# Patient Record
Sex: MALE | Race: WHITE | Employment: UNEMPLOYED | URBAN - NONMETROPOLITAN AREA
[De-identification: names, ages, dates, MRNs, and addresses within clinical notes are randomized per-mention and may not be internally consistent; named-entity substitution may affect disease eponyms.]

---

## 2020-11-06 ENCOUNTER — HOSPITAL ENCOUNTER (EMERGENCY)
Age: 45
Discharge: HOME | End: 2020-11-06
Attending: EMERGENCY MEDICINE
Payer: COMMERCIAL

## 2020-11-06 VITALS
RESPIRATION RATE: 16 BRPM | TEMPERATURE: 97.9 F | DIASTOLIC BLOOD PRESSURE: 83 MMHG | OXYGEN SATURATION: 97 % | HEIGHT: 67 IN | WEIGHT: 192 LBS | HEART RATE: 92 BPM | BODY MASS INDEX: 30.13 KG/M2 | SYSTOLIC BLOOD PRESSURE: 138 MMHG

## 2020-11-06 PROCEDURE — 6370000000 HC RX 637 (ALT 250 FOR IP): Performed by: EMERGENCY MEDICINE

## 2020-11-06 PROCEDURE — 99282 EMERGENCY DEPT VISIT SF MDM: CPT

## 2020-11-06 PROCEDURE — 12011 RPR F/E/E/N/L/M 2.5 CM/<: CPT

## 2020-11-06 RX ORDER — LIDOCAINE HYDROCHLORIDE 40 MG/ML
2.5 INJECTION, SOLUTION RETROBULBAR; TOPICAL ONCE
Status: DISCONTINUED | OUTPATIENT
Start: 2020-11-06 | End: 2020-11-06 | Stop reason: HOSPADM

## 2020-11-06 RX ORDER — LIDOCAINE HYDROCHLORIDE 40 MG/ML
SOLUTION TOPICAL ONCE
Status: DISCONTINUED | OUTPATIENT
Start: 2020-11-06 | End: 2020-11-06

## 2020-11-06 RX ORDER — LIDOCAINE HYDROCHLORIDE 40 MG/ML
INJECTION, SOLUTION RETROBULBAR; TOPICAL
Status: DISCONTINUED
Start: 2020-11-06 | End: 2020-11-06 | Stop reason: HOSPADM

## 2020-11-06 RX ORDER — CHLORHEXIDINE GLUCONATE 0.12 MG/ML
15 RINSE ORAL ONCE
Status: COMPLETED | OUTPATIENT
Start: 2020-11-06 | End: 2020-11-06

## 2020-11-06 RX ORDER — CHLORHEXIDINE GLUCONATE 0.12 MG/ML
15 RINSE ORAL 2 TIMES DAILY
Qty: 420 ML | Refills: 0 | Status: SHIPPED | OUTPATIENT
Start: 2020-11-06 | End: 2020-11-20

## 2020-11-06 RX ADMIN — CHLORHEXIDINE GLUCONATE 0.12% ORAL RINSE 15 ML: 1.2 LIQUID ORAL at 19:30

## 2020-11-06 ASSESSMENT — PAIN DESCRIPTION - LOCATION: LOCATION: MOUTH

## 2020-11-06 ASSESSMENT — PAIN DESCRIPTION - DESCRIPTORS: DESCRIPTORS: NAGGING

## 2020-11-06 ASSESSMENT — PAIN SCALES - GENERAL: PAINLEVEL_OUTOF10: 4

## 2020-11-06 ASSESSMENT — PAIN DESCRIPTION - PAIN TYPE: TYPE: ACUTE PAIN

## 2020-11-07 NOTE — ED NOTES
Patient discharged with [facility] rep. States they will be able to provide tylenol/ibuprofen for pain relief and will be able to fill prescription tomorrow am. Patient denies questions at this time.       Be Dawson RN  11/06/20 1810

## 2020-11-07 NOTE — ED PROVIDER NOTES
[facility]  ED encounter       CHIEF COMPLAINT   Chief Complaint   Patient presents with    Laceration     Tongue, onset 1 hr ago while playing basketball. Pt was hit in mouth with an elbow     HPI   Judy Wiley is a 54 y.o. male who lacerated the left tip of his tounge. The mechanism of the injury was basketball. This occurred this evening. Associated bleeding was alleviated by pressure. The pain is moderate    REVIEW OF SYSTEMS   Neurologic: No numbness or weakness distal to the wound  Skin: Bleeding Laceration as mentioned above  Musculoskeletal: No bony deformity      PAST MEDICAL & SURGICAL HISTORY   History reviewed. No pertinent past medical history.   Past Surgical History:   Procedure Laterality Date    CHOLECYSTECTOMY       CURRENT MEDICATIONS   Current Outpatient Rx   Medication Sig Dispense Refill    chlorhexidine (PERIDEX) 0.12 % solution Take 15 mLs by mouth 2 times daily for 14 days 420 mL 0     ALLERGIES   No Known Allergies  SOCIAL & FAMILY HISTORY   Social History     Socioeconomic History    Marital status:      Spouse name: None    Number of children: None    Years of education: None    Highest education level: None   Occupational History    None   Social Needs    Financial resource strain: None    Food insecurity     Worry: None     Inability: None    Transportation needs     Medical: None     Non-medical: None   Tobacco Use    Smoking status: Current Every Day Smoker     Packs/day: 1.00     Types: Cigarettes    Smokeless tobacco: Never Used   Substance and Sexual Activity    Alcohol use: None    Drug use: None    Sexual activity: None   Lifestyle    Physical activity     Days per week: None     Minutes per session: None    Stress: None   Relationships    Social connections     Talks on phone: None     Gets together: None     Attends Congregation service: None     Active member of club or organization: None     Attends meetings of clubs or organizations: None Relationship status: None    Intimate partner violence     Fear of current or ex partner: None     Emotionally abused: None     Physically abused: None     Forced sexual activity: None   Other Topics Concern    None   Social History Narrative    None     History reviewed. No pertinent family history. PHYSICAL EXAM   VITAL SIGNS: /83   Pulse 92   Temp 97.9 °F (36.6 °C) (Temporal)   Resp 16   Ht 5' 7\" (1.702 m)   Wt 192 lb (87.1 kg)   SpO2 97%   BMI 30.07 kg/m²   Constitutional: Well developed, well-nourished  HENT: 1.5 cm tip of left tongue laceration  NECK: Supple, No neck swelling  Respiratory: No respiratory distress  Cardiovascular: No JVD   NEUROLOGIC: Motor and sensory distal to the wound is intact and normal, patient is awake, alert, no slurred speech  Musculoskeletal: no injuries  Integument: 1.5 cm laceration localized over the toungue , not through and through    Laceration Repair Procedure Note  Details of the Procedure: The patient was placed in the appropriate position and local anesthesia obtained by infiltration using 4% topical lido The laceration was explored, then closed with closed with 5-0 with chromic gut. Total repaired wound length: 1.5 cm. Complications: None    RADIOLOGY  No orders to display     ED COURSE & MEDICAL DECISION MAKING   See chart for details of any medications ordered  Vitals:    11/06/20 1659   BP: 138/83   Pulse: 92   Resp: 16   Temp: 97.9 °F (36.6 °C)   TempSrc: Temporal   SpO2: 97%   Weight: 192 lb (87.1 kg)   Height: 5' 7\" (1.702 m)       Differential diagnosis: Tendon laceration, neurologic injury, vascular injury, involvement of bone that could lead to osteomyelitis, foreign body, left in the wound, delayed bacterial skin infection, other    Mdm: Patient well-appearing. I repaired his laceration. He will go rebekah with chlorhexidine swish and spit for 2 weeks. FINAL IMPRESSION   1.  Laceration of tongue, initial encounter        PLAN: Outpatient treatment, follow-up, and discharge instructions (see EMR)  Westley Betancourt MD    This note was completed with a voice recognition program.            Westley Betancourt MD  11/06/20 7013

## 2023-11-08 PROBLEM — S61.217A: Status: ACTIVE | Noted: 2019-03-20

## 2023-11-09 ENCOUNTER — LAB (OUTPATIENT)
Dept: LAB | Facility: LAB | Age: 48
End: 2023-11-09
Payer: COMMERCIAL

## 2023-11-09 ENCOUNTER — OFFICE VISIT (OUTPATIENT)
Dept: PRIMARY CARE | Facility: CLINIC | Age: 48
End: 2023-11-09
Payer: COMMERCIAL

## 2023-11-09 ENCOUNTER — TELEPHONE (OUTPATIENT)
Dept: PRIMARY CARE | Facility: CLINIC | Age: 48
End: 2023-11-09

## 2023-11-09 VITALS
WEIGHT: 181.4 LBS | BODY MASS INDEX: 28.47 KG/M2 | OXYGEN SATURATION: 98 % | DIASTOLIC BLOOD PRESSURE: 68 MMHG | HEIGHT: 67 IN | HEART RATE: 72 BPM | SYSTOLIC BLOOD PRESSURE: 118 MMHG

## 2023-11-09 DIAGNOSIS — Z13.220 SCREENING, LIPID: ICD-10-CM

## 2023-11-09 DIAGNOSIS — Z13.1 SCREENING FOR DIABETES MELLITUS: ICD-10-CM

## 2023-11-09 DIAGNOSIS — K62.5 RECTAL BLEEDING: ICD-10-CM

## 2023-11-09 DIAGNOSIS — K62.5 RECTAL BLEEDING: Primary | ICD-10-CM

## 2023-11-09 DIAGNOSIS — Z11.3 SCREEN FOR STD (SEXUALLY TRANSMITTED DISEASE): ICD-10-CM

## 2023-11-09 DIAGNOSIS — Z12.11 ENCOUNTER FOR SCREENING FOR MALIGNANT NEOPLASM OF COLON: ICD-10-CM

## 2023-11-09 DIAGNOSIS — K64.4 EXTERNAL HEMORRHOIDS: ICD-10-CM

## 2023-11-09 DIAGNOSIS — F17.200 TOBACCO USE DISORDER: ICD-10-CM

## 2023-11-09 DIAGNOSIS — B00.9 HSV-1 INFECTION: ICD-10-CM

## 2023-11-09 PROBLEM — K64.9 BLEEDING HEMORRHOIDS: Status: ACTIVE | Noted: 2023-11-09

## 2023-11-09 PROBLEM — K64.9 BLEEDING HEMORRHOIDS: Status: RESOLVED | Noted: 2023-11-09 | Resolved: 2023-11-09

## 2023-11-09 LAB
ALBUMIN SERPL BCP-MCNC: 4.4 G/DL (ref 3.4–5)
ALP SERPL-CCNC: 90 U/L (ref 33–120)
ALT SERPL W P-5'-P-CCNC: 12 U/L (ref 10–52)
ANION GAP SERPL CALC-SCNC: 11 MMOL/L (ref 10–20)
AST SERPL W P-5'-P-CCNC: 11 U/L (ref 9–39)
BASOPHILS # BLD AUTO: 0.07 X10*3/UL (ref 0–0.1)
BASOPHILS NFR BLD AUTO: 1 %
BILIRUB SERPL-MCNC: 0.5 MG/DL (ref 0–1.2)
BUN SERPL-MCNC: 12 MG/DL (ref 6–23)
CALCIUM SERPL-MCNC: 9.6 MG/DL (ref 8.6–10.3)
CHLORIDE SERPL-SCNC: 103 MMOL/L (ref 98–107)
CHOLEST SERPL-MCNC: 189 MG/DL (ref 0–199)
CHOLESTEROL/HDL RATIO: 3.4
CO2 SERPL-SCNC: 30 MMOL/L (ref 21–32)
CREAT SERPL-MCNC: 0.83 MG/DL (ref 0.5–1.3)
EOSINOPHIL # BLD AUTO: 0.19 X10*3/UL (ref 0–0.7)
EOSINOPHIL NFR BLD AUTO: 2.8 %
ERYTHROCYTE [DISTWIDTH] IN BLOOD BY AUTOMATED COUNT: 13.7 % (ref 11.5–14.5)
EST. AVERAGE GLUCOSE BLD GHB EST-MCNC: 105 MG/DL
GFR SERPL CREATININE-BSD FRML MDRD: >90 ML/MIN/1.73M*2
GLUCOSE SERPL-MCNC: 99 MG/DL (ref 74–99)
HBA1C MFR BLD: 5.3 %
HCT VFR BLD AUTO: 48.4 % (ref 41–52)
HCV AB SER QL: NONREACTIVE
HDLC SERPL-MCNC: 55 MG/DL
HGB BLD-MCNC: 15.9 G/DL (ref 13.5–17.5)
HIV 1+2 AB+HIV1 P24 AG SERPL QL IA: NONREACTIVE
IMM GRANULOCYTES # BLD AUTO: 0.01 X10*3/UL (ref 0–0.7)
IMM GRANULOCYTES NFR BLD AUTO: 0.1 % (ref 0–0.9)
LDLC SERPL CALC-MCNC: 116 MG/DL
LYMPHOCYTES # BLD AUTO: 2.09 X10*3/UL (ref 1.2–4.8)
LYMPHOCYTES NFR BLD AUTO: 30.7 %
MCH RBC QN AUTO: 30.4 PG (ref 26–34)
MCHC RBC AUTO-ENTMCNC: 32.9 G/DL (ref 32–36)
MCV RBC AUTO: 93 FL (ref 80–100)
MONOCYTES # BLD AUTO: 0.47 X10*3/UL (ref 0.1–1)
MONOCYTES NFR BLD AUTO: 6.9 %
NEUTROPHILS # BLD AUTO: 3.98 X10*3/UL (ref 1.2–7.7)
NEUTROPHILS NFR BLD AUTO: 58.5 %
NON HDL CHOLESTEROL: 134 MG/DL (ref 0–149)
NRBC BLD-RTO: 0 /100 WBCS (ref 0–0)
PLATELET # BLD AUTO: 228 X10*3/UL (ref 150–450)
POTASSIUM SERPL-SCNC: 4.8 MMOL/L (ref 3.5–5.3)
PROT SERPL-MCNC: 7.1 G/DL (ref 6.4–8.2)
RBC # BLD AUTO: 5.23 X10*6/UL (ref 4.5–5.9)
SODIUM SERPL-SCNC: 139 MMOL/L (ref 136–145)
T PALLIDUM AB SER QL: NONREACTIVE
TRIGL SERPL-MCNC: 92 MG/DL (ref 0–149)
VLDL: 18 MG/DL (ref 0–40)
WBC # BLD AUTO: 6.8 X10*3/UL (ref 4.4–11.3)

## 2023-11-09 PROCEDURE — 36415 COLL VENOUS BLD VENIPUNCTURE: CPT

## 2023-11-09 PROCEDURE — 87389 HIV-1 AG W/HIV-1&-2 AB AG IA: CPT

## 2023-11-09 PROCEDURE — 80053 COMPREHEN METABOLIC PANEL: CPT

## 2023-11-09 PROCEDURE — 80061 LIPID PANEL: CPT

## 2023-11-09 PROCEDURE — 87800 DETECT AGNT MULT DNA DIREC: CPT

## 2023-11-09 PROCEDURE — 83036 HEMOGLOBIN GLYCOSYLATED A1C: CPT

## 2023-11-09 PROCEDURE — 86780 TREPONEMA PALLIDUM: CPT

## 2023-11-09 PROCEDURE — 99204 OFFICE O/P NEW MOD 45 MIN: CPT | Performed by: STUDENT IN AN ORGANIZED HEALTH CARE EDUCATION/TRAINING PROGRAM

## 2023-11-09 PROCEDURE — 85025 COMPLETE CBC W/AUTO DIFF WBC: CPT

## 2023-11-09 PROCEDURE — 86803 HEPATITIS C AB TEST: CPT

## 2023-11-09 NOTE — ASSESSMENT & PLAN NOTE
Due to significant rectal bleeding will obtain diagnostic colonoscopy as well as repeat blood counts.  I feel the most likely cause of this is internal bleeding hemorrhoids but I am unable to rule out more dangerous causes such as colon cancer.  Provided information about hemorrhoids as well as supportive care and therapies.

## 2023-11-09 NOTE — PATIENT INSTRUCTIONS
INTRODUCTION  It can be scary to see blood in the toilet or when you wipe after a bowel movement. Fortunately, most of the causes of rectal bleeding are not life-threatening; common causes include hemorrhoids and anal fissures, which are discussed below. However, the only way to be certain of the cause is to be evaluated by a health care provider.  This article will discuss when to seek help for rectal bleeding, the most common causes of blood in the stool, and tests that may be recommended.  WHEN TO SEEK HELP  Most people with minor rectal bleeding do not have colon cancer or another serious condition. However, it is not possible to know the cause of bleeding without an examination by a clinician. For this reason, if you ever notice blood in your stool or bleeding from your rectum, you should contact your health care provider as soon as possible. They can give you advice about whether and when you should be examined or schedule tests.  You should also seek medical care if you notice a change in the frequency or consistency of your bowel movements, have abdominal pain, or feel very tired or weak.  RECTAL BLEEDING CAUSES   Common benign (non-serious) causes -- If you see a small amount of bright red blood on the toilet paper after wiping, on the outside of your stool, or in the toilet, this may be caused by hemorrhoids or an anal fissure. Both of these conditions are benign, and there are treatments that can help.  Hemorrhoids -- Hemorrhoids are swollen blood vessels in the rectum or anus that can be painful, itchy, and sometimes bleed (figure 1). People with hemorrhoids often have painless rectal bleeding; bright red blood may coat the stool after a bowel movement, drip into the toilet, or stain toilet paper. More information about hemorrhoids is available separately.   Anal fissure -- An anal fissure is a tear in the lining of the anus (the opening where stool exits the body). Anal fissures can cause bleeding and a  sensation of tearing, ripping, or burning pain during or after a bowel movement. This is discussed more separately.   Serious causes -- While most rectal bleeding is caused by the non-serious causes mentioned above, it can also be caused by cancerous or precancerous conditions. Precancerous polyps near the end of the colon (large intestine) can mimic bleeding from hemorrhoids. These polyps are generally present in the colon for years before they become cancerous, and they can be removed very safely from the colon, preventing progression to cancer.   Colorectal cancer is sometimes diagnosed in people who have ignored bleeding for years because they assumed it was from hemorrhoids. This is why anyone with rectal bleeding should consult a health care provider to determine the cause. If colorectal cancer is diagnosed early, it can often be treated.   The risk of polyps and colon cancer increases with age; this is why screening tests are usually recommended beginning at age 45 to 50 years, or earlier for people with certain additional risk factors.   Other possible causes -- There are other causes of rectal bleeding as well, including colitis (inflammation of the colon), proctitis (inflammation of the rectum), diverticular disease, angioectasias (abnormal blood vessels in the gastrointestinal tract), and rectal ulcers.  Bleeding from higher in the digestive tract, such as the stomach, can cause black, tarry-looking bowel movements; this is because stomach acid turns blood black. In other cases, however, upper gastrointestinal bleeding appears bright red in color.  Your health care provider can figure out the cause of your bleeding based on your symptoms and test results.  RECTAL BLEEDING TESTS  The best approach to determining the cause of rectal bleeding depends upon your age, symptoms, and medical history. Your health care provider can learn more by asking questions about your bleeding (including frequency, amount, and  "appearance), bowel habits, whether you have other symptoms such as pain, and your family history (for example, if you have relatives who have been diagnosed with colorectal cancer or other cancers).  Physical examination -- Sometimes a health care provider can determine the cause of rectal bleeding with a rectal examination. This typically involves inspecting the outside of the anus and inserting a gloved finger into the rectum to feel for lumps or other abnormalities. An anoscopy may be done at the same time. During an anoscopy, the clinician uses a rigid tube to inspect the anal canal and lower rectum. This can be done in the office and does not require sedation.  Diagnostic tests -- There are several tests that allow a provider to examine the inside of the colon, rectum, and anus. These procedures are performed using specialized instruments called \"scopes\":  ?Sigmoidoscopy - During a sigmoidoscopy, a clinician can examine the rectum and most of the lower large intestine  ?Colonoscopy - A colonoscopy is a procedure in which a clinician examines the entire colon.   While the recommended tests may be different for different people, the important thing is to see a health care provider for evaluation. This is the only way to figure out for sure what is causing your rectal bleeding and get you the care and treatment you need.  "

## 2023-11-09 NOTE — PROGRESS NOTES
Subjective:  Tashi Sarkar is a 48 y.o. male who presents to clinic today for No chief complaint on file.      ***    Review of Systems    Assessment/Plan:  Tashi Sarkar is a 48 y.o. male with a history of *** who presents to clinic today to address the following issues:   1. Bleeding hemorrhoids            Problem List Items Addressed This Visit          Gastrointestinal and Abdominal    Bleeding hemorrhoids - Primary    Overview     ....ddd           Current Assessment & Plan     dddd                There are no Patient Instructions on file for this visit.    Follow up: ***    Return precautions discussed.  An After Visit Summary was given to the patient.  All questions were answered and patient in agreement with plan.    Objective:  There were no vitals taken for this visit.  ***  Physical Exam    I spent *** minutes in total time for this visit including all related clinical activities before, during, and after the visit excluding other billable activities/procedure time.     Kalina Espinoza MD

## 2023-11-09 NOTE — PROGRESS NOTES
Subjective:  Tashi Sarkar is a 48 y.o. male who presents to clinic today for NP, ER FU Rectal Bleeding (10/17)    Rectal Bleeding:  - typically bright red but has had some maroon stools  - started a few months ago, initially was spotting  - always after having a bowel movement  - toilet water is turning red, not just on toilet paper  - progressively worsening symptoms  - no pain with bowel movements  - no difficulty with bowel movements  - stools are usual caliber   - no unintentional weight loss or fevers  - no obvious pain with sitting or hemorrhoids   - does not strain with bowel movements   - no stomach issues or pain  - no heart burn  - stools are sometimes hard and streaking the toilet seat    Assessment/Plan:  Tashi Sarkar is a 48 y.o. male with a history of HSV-1, alcohol use and tobacco use disorder who presents to clinic today to address the following issues:   1. Rectal bleeding  CBC and Auto Differential    Colonoscopy Screening; High Risk Patient    CANCELED: Colonoscopy Diagnostic      2. External hemorrhoids        3. HSV-1 infection        4. Screen for STD (sexually transmitted disease)  HIV-1 and HIV-2 antibodies    Hepatitis C antibody    Syphilis Screen with Reflex    C. trachomatis / N. gonorrhoeae, DNA probe      5. Screening for diabetes mellitus  Hemoglobin A1c    Comprehensive metabolic panel      6. Screening, lipid  Lipid panel      7. Encounter for screening for malignant neoplasm of colon  Colonoscopy Screening; High Risk Patient      8. Tobacco use disorder            Problem List Items Addressed This Visit          Gastrointestinal and Abdominal    Rectal bleeding - Primary    Overview     Rectal bleeding initially noted early fall 2023.  Patient with signs and symptoms of hemorrhoids.         Current Assessment & Plan     Due to significant rectal bleeding will obtain diagnostic colonoscopy as well as repeat blood counts.  I feel the most likely cause of this is  "internal bleeding hemorrhoids but I am unable to rule out more dangerous causes such as colon cancer.  Provided information about hemorrhoids as well as supportive care and therapies.         Relevant Orders    CBC and Auto Differential    Colonoscopy Screening; High Risk Patient       Infectious Diseases    HSV-1 infection    Overview     Diagnosed October 2023 via swab confirming HSV 1.            Tobacco    Tobacco use disorder    Overview     Current 1 pack/day smoker for the last 30 years.         Current Assessment & Plan     Patient not interested in discussing change at this time.  He is not interested in quitting.          Other Visit Diagnoses       External hemorrhoids        Screen for STD (sexually transmitted disease)        Relevant Orders    HIV-1 and HIV-2 antibodies    Hepatitis C antibody    Syphilis Screen with Reflex    C. trachomatis / N. gonorrhoeae, DNA probe    Screening for diabetes mellitus        Relevant Orders    Hemoglobin A1c    Comprehensive metabolic panel    Screening, lipid        Relevant Orders    Lipid panel    Encounter for screening for malignant neoplasm of colon        Relevant Orders    Colonoscopy Screening; High Risk Patient          Follow up: 3 months for annual physical and review of colonoscopy results    Return precautions discussed.  An After Visit Summary was given to the patient.  All questions were answered and patient in agreement with plan.    Objective:  /68   Pulse 72   Ht 1.702 m (5' 7\")   Wt 82.3 kg (181 lb 6.4 oz)   SpO2 98%   BMI 28.41 kg/m²     Physical Exam  Exam conducted with a chaperone present.   Constitutional:       General: He is not in acute distress.     Appearance: Normal appearance. He is not ill-appearing.   HENT:      Head: Normocephalic and atraumatic.      Mouth/Throat:      Mouth: Mucous membranes are moist.   Eyes:      General: No scleral icterus.     Extraocular Movements: Extraocular movements intact.      " Conjunctiva/sclera: Conjunctivae normal.   Genitourinary:     Prostate: Normal.      Rectum: External hemorrhoid present. No mass, tenderness or anal fissure. Normal anal tone.      Comments: Small non-thrombosed external hemorrhoid  Skin:     General: Skin is warm and dry.   Neurological:      General: No focal deficit present.      Mental Status: He is alert and oriented to person, place, and time.   Psychiatric:         Thought Content: Thought content normal.         Judgment: Judgment normal.         I spent 45 minutes in total time for this visit including all related clinical activities before, during, and after the visit excluding other billable activities/procedure time.     Kalina Espinoza MD

## 2023-11-10 ENCOUNTER — TELEPHONE (OUTPATIENT)
Dept: PRIMARY CARE | Facility: CLINIC | Age: 48
End: 2023-11-10
Payer: COMMERCIAL

## 2023-11-10 DIAGNOSIS — Z12.11 SPECIAL SCREENING FOR MALIGNANT NEOPLASM OF COLON: ICD-10-CM

## 2023-11-10 DIAGNOSIS — K62.5 RECTAL BLEEDING: ICD-10-CM

## 2023-11-10 LAB
C TRACH RRNA SPEC QL NAA+PROBE: NEGATIVE
N GONORRHOEA DNA SPEC QL PROBE+SIG AMP: NEGATIVE

## 2023-11-10 NOTE — TELEPHONE ENCOUNTER
Maureen:    Please call patient and notify him the following:  He does have an elevated cholesterol test and we can discuss ways to lower this at his follow up appointment.   Your HIV test is negative.  Your hepatitis C test is negative.  Your syphilis test is negative.   Your gonorrhea and chlamydia were negative from all sites tested.   Your kidney and liver function are normal and your electrolytes are within normal limits.   Your blood counts are normal.    Please close the encounter once finished.    Thank you!  Kalina Espinoza MD

## 2023-12-15 ENCOUNTER — HOSPITAL ENCOUNTER (OUTPATIENT)
Dept: GASTROENTEROLOGY | Facility: CLINIC | Age: 48
Discharge: HOME | End: 2023-12-15
Payer: COMMERCIAL

## 2023-12-15 VITALS
SYSTOLIC BLOOD PRESSURE: 120 MMHG | HEART RATE: 69 BPM | BODY MASS INDEX: 25.83 KG/M2 | HEIGHT: 70 IN | OXYGEN SATURATION: 97 % | DIASTOLIC BLOOD PRESSURE: 79 MMHG | TEMPERATURE: 97.7 F | RESPIRATION RATE: 16 BRPM | WEIGHT: 180.4 LBS

## 2023-12-15 DIAGNOSIS — Z12.11 ENCOUNTER FOR SCREENING FOR MALIGNANT NEOPLASM OF COLON: ICD-10-CM

## 2023-12-15 DIAGNOSIS — K62.5 RECTAL BLEEDING: Primary | ICD-10-CM

## 2023-12-15 PROCEDURE — 0753T DGTZ GLS MCRSCP SLD LEVEL IV: CPT | Mod: TC,SUR,SAMLAB | Performed by: INTERNAL MEDICINE

## 2023-12-15 PROCEDURE — 3700000013 HC SEDATION LEVEL 5+ TIME - EACH ADDITIONAL 15 MINUTES

## 2023-12-15 PROCEDURE — 88305 TISSUE EXAM BY PATHOLOGIST: CPT | Performed by: PATHOLOGY

## 2023-12-15 PROCEDURE — 2720000007 HC OR 272 NO HCPCS

## 2023-12-15 PROCEDURE — 99153 MOD SED SAME PHYS/QHP EA: CPT | Performed by: INTERNAL MEDICINE

## 2023-12-15 PROCEDURE — 45385 COLONOSCOPY W/LESION REMOVAL: CPT | Performed by: INTERNAL MEDICINE

## 2023-12-15 PROCEDURE — 99152 MOD SED SAME PHYS/QHP 5/>YRS: CPT | Performed by: INTERNAL MEDICINE

## 2023-12-15 PROCEDURE — 3700000012 HC SEDATION LEVEL 5+ TIME - INITIAL 15 MINUTES 5/> YEARS

## 2023-12-15 PROCEDURE — 7100000010 HC PHASE TWO TIME - EACH INCREMENTAL 1 MINUTE

## 2023-12-15 PROCEDURE — 2500000004 HC RX 250 GENERAL PHARMACY W/ HCPCS (ALT 636 FOR OP/ED): Performed by: INTERNAL MEDICINE

## 2023-12-15 PROCEDURE — 7100000009 HC PHASE TWO TIME - INITIAL BASE CHARGE

## 2023-12-15 RX ORDER — MEPERIDINE HYDROCHLORIDE 25 MG/ML
INJECTION INTRAMUSCULAR; INTRAVENOUS; SUBCUTANEOUS AS NEEDED
Status: COMPLETED | OUTPATIENT
Start: 2023-12-15 | End: 2023-12-15

## 2023-12-15 RX ORDER — MIDAZOLAM HYDROCHLORIDE 5 MG/ML
INJECTION, SOLUTION INTRAMUSCULAR; INTRAVENOUS AS NEEDED
Status: COMPLETED | OUTPATIENT
Start: 2023-12-15 | End: 2023-12-15

## 2023-12-15 RX ORDER — SODIUM CHLORIDE, SODIUM LACTATE, POTASSIUM CHLORIDE, CALCIUM CHLORIDE 600; 310; 30; 20 MG/100ML; MG/100ML; MG/100ML; MG/100ML
20 INJECTION, SOLUTION INTRAVENOUS CONTINUOUS
Status: DISCONTINUED | OUTPATIENT
Start: 2023-12-15 | End: 2023-12-16 | Stop reason: HOSPADM

## 2023-12-15 RX ADMIN — MIDAZOLAM HYDROCHLORIDE 2.5 MG: 5 INJECTION, SOLUTION INTRAMUSCULAR; INTRAVENOUS at 11:34

## 2023-12-15 RX ADMIN — MEPERIDINE HYDROCHLORIDE 25 MG: 25 INJECTION INTRAMUSCULAR; INTRAVENOUS; SUBCUTANEOUS at 11:30

## 2023-12-15 RX ADMIN — MIDAZOLAM HYDROCHLORIDE 2.5 MG: 5 INJECTION, SOLUTION INTRAMUSCULAR; INTRAVENOUS at 11:30

## 2023-12-15 RX ADMIN — SODIUM CHLORIDE, POTASSIUM CHLORIDE, SODIUM LACTATE AND CALCIUM CHLORIDE 20 ML/HR: 600; 310; 30; 20 INJECTION, SOLUTION INTRAVENOUS at 10:15

## 2023-12-15 RX ADMIN — MIDAZOLAM HYDROCHLORIDE 2.5 MG: 5 INJECTION, SOLUTION INTRAMUSCULAR; INTRAVENOUS at 11:37

## 2023-12-15 RX ADMIN — MEPERIDINE HYDROCHLORIDE 25 MG: 25 INJECTION INTRAMUSCULAR; INTRAVENOUS; SUBCUTANEOUS at 11:34

## 2023-12-15 ASSESSMENT — PAIN - FUNCTIONAL ASSESSMENT
PAIN_FUNCTIONAL_ASSESSMENT: 0-10

## 2023-12-15 ASSESSMENT — PAIN SCALES - GENERAL
PAINLEVEL_OUTOF10: 0 - NO PAIN
PAINLEVEL_OUTOF10: 3
PAINLEVEL_OUTOF10: 0 - NO PAIN

## 2023-12-15 ASSESSMENT — COLUMBIA-SUICIDE SEVERITY RATING SCALE - C-SSRS
6. HAVE YOU EVER DONE ANYTHING, STARTED TO DO ANYTHING, OR PREPARED TO DO ANYTHING TO END YOUR LIFE?: NO
1. IN THE PAST MONTH, HAVE YOU WISHED YOU WERE DEAD OR WISHED YOU COULD GO TO SLEEP AND NOT WAKE UP?: NO
2. HAVE YOU ACTUALLY HAD ANY THOUGHTS OF KILLING YOURSELF?: NO

## 2023-12-15 NOTE — PRE-SEDATION DOCUMENTATION
Patient: Tashi Sarkar  MRN: [id number]    Pre-sedation Evaluation:  Sedation necessary for: Analgesia  Requesting service: endo    History of Present Illness: colonoscopy     History reviewed. No pertinent past medical history.    Principle problems:  Patient Active Problem List    Diagnosis Date Noted    Rectal bleeding 11/09/2023    HSV-1 infection 11/09/2023    Tobacco use disorder 11/09/2023    Laceration of left little finger with tendon involvement 03/20/2019    Corneal foreign body 09/17/2014     Allergies:  No Known Allergies  PTA/Current Medications:  (Not in a hospital admission)    No current outpatient medications on file.     Current Facility-Administered Medications   Medication Dose Route Frequency Provider Last Rate Last Admin    lactated Ringer's infusion  20 mL/hr intravenous Continuous Rowdy Martinez DO 20 mL/hr at 12/15/23 1015 20 mL/hr at 12/15/23 1015     Past Surgical History:   has a past surgical history that includes Cholecystectomy.    Recent sedation/surgery (24 hours) No    Review of Systems:  Please check all that apply: No significant medical history        NPO guidelines met: Yes    Physical Exam    Airway  Mallampati: II     Cardiovascular - normal exam     Dental    Pulmonary - normal exam         Plan    ASA 2     Moderate

## 2023-12-15 NOTE — DISCHARGE INSTRUCTIONS
Patient Instructions after a Colonoscopy      The anesthetics, sedatives or narcotics which were given to you today will be acting in your body for the next 24 hours, so you might feel a little sleepy or groggy.  This feeling should slowly wear off. Carefully read and follow the instructions.     You received sedation today:  - Do not drive or operate any machinery or power tools of any kind.   - No alcoholic beverages today, not even beer or wine.  - Do not make any important decisions or sign any legal documents.  - No over the counter medications that contain alcohol or that may cause drowsiness.  - Do not make any important decisions or sign any legal documents.    While it is common to experience mild to moderate abdominal distention, gas, or belching after your procedure, if any of these symptoms occur following discharge from the GI Lab or within one week of having your procedure, call the [facility] to be advised whether a visit to your nearest Urgent Care or Emergency Department is indicated.  Take this paper with you if you go.     - If you develop an allergic reaction to the medications that were given during your procedure such as difficulty breathing, rash, hives, severe nausea, vomiting or lightheadedness.  - If you experience chest pain, shortness of breath, severe abdominal pain, fevers and chills.  -If you develop signs and symptoms of bleeding such as blood in your spit, if your stools turn black, tarry, or bloody  - If you have not urinated within 8 hours following your procedure.  - If your IV site becomes painful, red, inflamed, or looks infected.    If you received a biopsy/polypectomy/sphincterotomy the following instructions apply below:    __ Do not use Aspirin containing products, non-steroidal medications or anti-coagulants for one week following your procedure. (Examples of these types of medications are: Advil, Arthrotec, Aleve, Coumadin, Ecotrin, Heparin, Ibuprofen,  Indocin, Motrin, Naprosyn, Nuprin, Plavix, Vioxx, and Voltarin, or their generic forms.  This list is not all-inclusive.  Check with your physician or pharmacist before resuming medications.)   __ Eat a soft diet today.  Avoid foods that are poorly digested for the next 24 hours.  These foods would include: nuts, beans, lettuce, red meats, and fried foods. Start with liquids and advance your diet as tolerated, gradually work up to eating solids.   __ Do not have a Barium Study or Enema for one week.    Your physician recommends the additional following instructions:    -You have a contact number available for emergencies. The signs and symptoms of potential delayed complications were discussed with you. You may return to normal activities tomorrow.  -Resume your previous diet.  -Continue your present medications.   -We are waiting for your pathology results.  -Your physician has recommended a repeat colonoscopy (date to be determined after pending pathology results are reviewed) for surveillance based on pathology results.  -The findings and recommendations have been discussed with you.  -The findings and recommendations were discussed with your family.  - Please see Medication Reconciliation Form for new medication/medications prescribed.       If you experience any problems or have any questions following discharge from the GI Lab, please call:        Nurse Signature                                                                        Date___________________                                                                            Patient/Responsible Party Signature                                        Date___________________

## 2023-12-15 NOTE — H&P
"History Of Present Illness  Tashi Sarkar is a 48 y.o. male presenting with  Cancer screening.     Past Medical History  History reviewed. No pertinent past medical history.    Surgical History  Past Surgical History:   Procedure Laterality Date    CHOLECYSTECTOMY          Social History  He reports that he has been smoking cigarettes. He has a 30.00 pack-year smoking history. He has never used smokeless tobacco. He reports current alcohol use of about 10.0 standard drinks of alcohol per week. He reports current drug use. Drug: Marijuana.    Family History  Family History   Adopted: Yes        Allergies  Patient has no known allergies.    Review of Systems     Physical Exam  Vitals and nursing note reviewed.   Constitutional:       Appearance: Normal appearance.   HENT:      Head: Normocephalic.      Mouth/Throat:      Mouth: Mucous membranes are moist.      Pharynx: Oropharynx is clear.   Eyes:      Pupils: Pupils are equal, round, and reactive to light.   Cardiovascular:      Rate and Rhythm: Normal rate and regular rhythm.   Pulmonary:      Effort: Pulmonary effort is normal.      Breath sounds: Normal breath sounds.   Abdominal:      General: Abdomen is flat. Bowel sounds are normal.      Palpations: Abdomen is soft.   Musculoskeletal:         General: Normal range of motion.      Cervical back: Normal range of motion and neck supple.   Skin:     General: Skin is warm and dry.   Neurological:      General: No focal deficit present.      Mental Status: He is alert and oriented to person, place, and time.   Psychiatric:         Mood and Affect: Mood normal.         Behavior: Behavior normal.          Last Recorded Vitals  Blood pressure 128/75, pulse 75, temperature 36.5 °C (97.7 °F), resp. rate 16, height 1.778 m (5' 10\"), weight 81.8 kg (180 lb 6.4 oz), SpO2 97 %.    Relevant Results             Assessment/Plan   Active Problems:  There are no active Hospital Problems.      Problem List Items Addressed This " Visit       Rectal bleeding - Primary    Relevant Orders    Colonoscopy Screening; High Risk Patient     Other Visit Diagnoses       Encounter for screening for malignant neoplasm of colon        Relevant Orders    Colonoscopy Screening; High Risk Patient                I spent  minutes in the professional and overall care of this patient.      Rodwy Martinez, DO

## 2023-12-22 LAB
LABORATORY COMMENT REPORT: NORMAL
PATH REPORT.FINAL DX SPEC: NORMAL
PATH REPORT.GROSS SPEC: NORMAL
PATH REPORT.TOTAL CANCER: NORMAL

## 2024-01-19 ENCOUNTER — TELEPHONE (OUTPATIENT)
Dept: GASTROENTEROLOGY | Facility: CLINIC | Age: 49
End: 2024-01-19
Payer: COMMERCIAL

## 2024-01-19 NOTE — TELEPHONE ENCOUNTER
Patient was given results and verbalized understanding.   ----- Message from Rowdy Martinez DO sent at 1/2/2024  7:55 AM EST -----  Benign, hyperplastic polyp.  No increased risk of colon cancer repeat colonoscopy in 10 years for average risk screening

## 2024-02-12 ENCOUNTER — TELEPHONE (OUTPATIENT)
Dept: PRIMARY CARE | Facility: CLINIC | Age: 49
End: 2024-02-12

## 2024-02-20 ENCOUNTER — TELEPHONE (OUTPATIENT)
Dept: PRIMARY CARE | Facility: CLINIC | Age: 49
End: 2024-02-20